# Patient Record
(demographics unavailable — no encounter records)

---

## 2018-12-01 NOTE — PDOC
History of Present Illness





- General


History Source: Patient


Exam Limitations: No Limitations





- History of Present Illness


Initial Comments: 





12/01/18 16:49


The patient is a 52 year old male, with no significant PMH,  who presents to 

the emergency department with a sudden onset of  knee pain beginning 2 days ago 

that progressively worsened today. The patient states the constant non 

radiating  pain is located to the right knee with associated symptoms of 

swelling and pressure sensation. The patient states he goes to PT for both of 

his  knees  but reports no improvement.  The patient notes decrease ability  

to ambulate and difficulty  bending his right knee, no relief with Tylenol. He 

denies any trauma prior to the pain. The patient denies any numbness or 

tingling. The patient denies chest pain, shortness of breath, headache and 

dizziness. Denies fever, chills, nausea, vomit, diarrhea and constipation.


 





Allergies: NKDA


Past surgical history: None reported 


Social history: None reported


PCP: None reported 








<Artemio Robertson - Last Filed: 12/01/18 16:49>





<Ravi Tee - Last Filed: 12/01/18 17:47>





- General


Chief Complaint: Pain


Stated Complaint: RT KNEE PAIN


Time Seen by Provider: 12/01/18 15:32





Past History





<Artemio Robertson - Last Filed: 12/01/18 16:49>





- Past Medical History


Anemia: No


Asthma: No


Cancer: No


Cardiac Disorders: No


COPD: No


HTN: Yes


Kidney Stones: No


Liver Disease: No





- Surgical History


Abdominal Surgery: No


Appendectomy: No


Cardiac Surgery: No





- Suicide/Smoking/Psychosocial Hx


Smoking Status: No


Smoking History: Never smoked


Have you smoked in the past 12 months: No


Number of Cigarettes Smoked Daily: 0


Information on smoking cessation initiated: No


Hx Alcohol Use: Yes


Drug/Substance Use Hx: No


Substance Use Type: Alcohol





<Ravi Tee - Last Filed: 12/01/18 17:47>





- Past Medical History


Allergies/Adverse Reactions: 


 Allergies











Allergy/AdvReac Type Severity Reaction Status Date / Time


 


No Known Allergies Allergy   Verified 10/11/16 17:08











Home Medications: 


Ambulatory Orders





Ibuprofen 800 mg PO TID #30 tablet 12/01/18 











**Review of Systems





- Review of Systems


Able to Perform ROS?: Yes


Comments:: 





12/01/18 16:50


GENERAL/CONSTITUTIONAL: No fever or chills. No weakness.


HEAD, EYES, EARS, NOSE AND THROAT: No change in vision. No ear pain or 

discharge. No sore throat.


CARDIOVASCULAR: No chest pain or shortness of breath.


RESPIRATORY: No cough, wheezing, or hemoptysis.


GASTROINTESTINAL: No nausea, vomiting, diarrhea or constipation.


GENITOURINARY: No dysuria, frequency, or change in urination.


MUSCULOSKELETAL: + right knee pain. No neck or back pain.


SKIN: No rash


NEUROLOGIC: No headache, vertigo, loss of consciousness, or change in strength/

sensation.


ENDOCRINE: No increased thirst. No abnormal weight change.


HEMATOLOGIC/LYMPHATIC: No anemia, easy bleeding, or history of blood clots.


ALLERGIC/IMMUNOLOGIC: No hives or skin allergy.














<Artemio Robertson - Last Filed: 12/01/18 16:49>





*Physical Exam





- Vital Signs


 Last Vital Signs











Temp Pulse Resp BP Pulse Ox


 


 98.5 F   88   20   182/79 H  100 


 


 12/01/18 15:10  12/01/18 15:10  12/01/18 15:10  12/01/18 15:10  12/01/18 15:10














- Physical Exam


Comments: 





12/01/18 16:51


GENERAL: Awake, alert, and fully oriented, in no acute distress


HEAD: No signs of trauma


LUNGS: Breath sounds equal, clear to auscultation bilaterally.  No wheezes, and 

no crackles


HEART: Regular rate and rhythm, normal S1 and S2, no murmurs, rubs or gallops


EXTREMITIES: +Right knee large effusion no deformities, no erythema. Moderate 

warmth. Meniscus ligament unable to evaluate secondary to effusion and 

guarding.Distal pulse intact. No calf swelling or tenderness.  


SKIN: Warm, Dry, normal turgor, no rashes or lesions noted.











<Artemio Rboertson - Last Filed: 12/01/18 16:49>





- Vital Signs


 Last Vital Signs











Temp Pulse Resp BP Pulse Ox


 


 98.5 F   88   20   182/79 H  100 


 


 12/01/18 15:10  12/01/18 15:10  12/01/18 15:10  12/01/18 15:10  12/01/18 15:10














<Ravi Tee - Last Filed: 12/01/18 17:47>





Moderate Sedation





- Procedure Monitoring


Vital Signs: 


Procedure Monitoring Vital Signs











Temperature  98.5 F   12/01/18 15:10


 


Pulse Rate  88   12/01/18 15:10


 


Respiratory Rate  20   12/01/18 15:10


 


Blood Pressure  182/79 H  12/01/18 15:10


 


O2 Sat by Pulse Oximetry (%)  100   12/01/18 15:10











<Artemio Robertson - Last Filed: 12/01/18 16:49>





- Procedure Monitoring


Vital Signs: 


Procedure Monitoring Vital Signs











Temperature  98.5 F   12/01/18 15:10


 


Pulse Rate  88   12/01/18 15:10


 


Respiratory Rate  20   12/01/18 15:10


 


Blood Pressure  182/79 H  12/01/18 15:10


 


O2 Sat by Pulse Oximetry (%)  100   12/01/18 15:10











<Ravi Tee - Last Filed: 12/01/18 17:47>





ED Treatment Course





- LABORATORY


CBC & Chemistry Diagram: 


 12/01/18 16:50





 12/01/18 16:50





<Ravi Tee - Last Filed: 12/01/18 17:47>





*DC/Admit/Observation/Transfer





- Attestations


Scribe Attestion: 





12/01/18 16:51





Documentation prepared by Artemio Robertson, acting as medical scribe for Ravi Davila MD. 





<Artemio Robertson - Last Filed: 12/01/18 16:49>





- Discharge Dispostion


Decision to Admit order: No





<Ravi Tee - Last Filed: 12/01/18 17:47>


Diagnosis at time of Disposition: 


Gout


Qualifiers:


 Gout site: knee Gout etiology: drug-induced Chronicity: acute Laterality: 

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








- Discharge Dispostion


Disposition: HOME


Condition at time of disposition: Improved





- Prescriptions


Prescriptions: 


Ibuprofen 800 mg PO TID #30 tablet





- Patient Instructions


Printed Discharge Instructions:  Low-Purine Diet, DI for Gout


Additional Instructions: 


Stop your Chlorthaladone as this medicine can cause gout.





follow low purine diet





no alcoholic beverages





See your primary care doctor to determine if substitute blood pressure medicine 

is needed. Recheck BP 1-2 days.